# Patient Record
(demographics unavailable — no encounter records)

---

## 2024-11-20 NOTE — ASSESSMENT
[FreeTextEntry1] : IMPRESSION: Pt with non-specific symptoms.   PLAN: Will coordinate care with Dr. Eliazar Magana. Neurosurgeon.

## 2024-11-20 NOTE — REASON FOR VISIT
[Home] : at home, [unfilled] , at the time of the visit. [Medical Office: ([facility])___] : at the medical office located in  [Patient] : the patient [Self] : self

## 2024-11-20 NOTE — END OF VISIT
[Time Spent: ___ minutes] : I have spent [unfilled] minutes of time on the encounter which excludes teaching and separately reported services. [FreeTextEntry3] : I, Dr.Daniel Campos, personally performed the evaluation and management (E/M) services for this established patient who presents today with (a) new problem(s)/exacerbation of (an) existing condition(s). That E/M includes conducting the clinically appropriate interval history &/or exam, assessing all new/exacerbated conditions, and establishing a new plan of care. Today, my PIERRE, Marge Camacho DNP, was here to observe my evaluation and management service for this new problem/exacerbated condition and follow the plan of care established by me going forward.

## 2024-11-20 NOTE — REASON FOR VISIT
[Home] : at home, [unfilled] , at the time of the visit. [Medical Office: ([facility])___] : at the medical office located in  [Patient] : the patient [Self] : self

## 2024-11-20 NOTE — HISTORY OF PRESENT ILLNESS
[FreeTextEntry1] : To review, Guanaco Love is an 81-year-old male who is located it [facility] He has a history significant for colon cancer. Patient states that in 2022 he was admitted to the hospital for cholecystitis when he developed sever back pain. He received a complete work and was diagnosed with osteomyelitis. For this he received a PICC line and abx. When patient was discharged he went to rehab. He currently c/o constant LBP that radiates down his posterior thighs and into the anterior aspect of his feet. He states that for the past year, he has noticed a bilateral weakness in his feet. He states that he has also become "clumsy" and is "unable to walk a straight line" He feels "bee stings" between his shoulder blades and notes that his right leg is weaker than his left.  He states that a surgeon in [facility] focused on the thoracic spine and offered him surgery for his problem there.  He is seeking another opinion as he does not feel that this is the biggest problem.  Today patient reports that symptoms are unchanged.

## 2024-11-20 NOTE — HISTORY OF PRESENT ILLNESS
[id number] [FreeTextEntry1] : To review, Guanaco Love is an 81-year-old male who is located it [facility]. He has a history significant for colon cancer. Patient states that in 2022 he was admitted to the hospital for cholecystitis when he developed sever back pain. He received a complete work and was diagnosed with osteomyelitis. For this he received a PICC line and abx. When patient was discharged he went to rehab. He currently c/o constant LBP that radiates down his posterior thighs and into the anterior aspect of his feet. He states that for the past year, he has noticed a bilateral weakness in his feet. He states that he has also become "clumsy" and is "unable to walk a straight line" He feels "bee stings" between his shoulder blades and notes that his right leg is weaker than his left.  He states that a surgeon in [facility] focused on the thoracic spine and offered him surgery for his problem there.  He is seeking another opinion as he does not feel that this is the biggest problem.  Today patient reports that symptoms are unchanged.